# Patient Record
Sex: MALE | Race: WHITE | URBAN - METROPOLITAN AREA
[De-identification: names, ages, dates, MRNs, and addresses within clinical notes are randomized per-mention and may not be internally consistent; named-entity substitution may affect disease eponyms.]

---

## 2017-04-12 ENCOUNTER — OFFICE VISIT (OUTPATIENT)
Dept: OPHTHALMOLOGY | Facility: CLINIC | Age: 77
End: 2017-04-12

## 2017-04-12 DIAGNOSIS — H25.13 SENILE NUCLEAR SCLEROSIS, BILATERAL: ICD-10-CM

## 2017-04-12 DIAGNOSIS — H40.1133 PRIMARY OPEN ANGLE GLAUCOMA OF BOTH EYES, SEVERE STAGE: Primary | ICD-10-CM

## 2017-04-12 RX ORDER — CAPSAICIN 0.025 %
CREAM (GRAM) TOPICAL 3 TIMES DAILY
COMMUNITY

## 2017-04-12 ASSESSMENT — TONOMETRY
OS_IOP_MMHG: 15
OD_IOP_MMHG: 15
IOP_METHOD: APPLANATION

## 2017-04-12 ASSESSMENT — VISUAL ACUITY
OS_SC: 20/25
OD_SC+: -3
OD_SC: 20/25
METHOD: SNELLEN - LINEAR
OS_SC+: -3

## 2017-04-12 ASSESSMENT — EXTERNAL EXAM - RIGHT EYE: OD_EXAM: NORMAL

## 2017-04-12 ASSESSMENT — CONF VISUAL FIELD
OS_INFERIOR_TEMPORAL_RESTRICTION: 3
OS_INFERIOR_NASAL_RESTRICTION: 3
OD_NORMAL: 1

## 2017-04-12 ASSESSMENT — SLIT LAMP EXAM - LIDS
COMMENTS: NORMAL
COMMENTS: NORMAL

## 2017-04-12 ASSESSMENT — EXTERNAL EXAM - LEFT EYE: OS_EXAM: NORMAL

## 2017-04-12 ASSESSMENT — REFRACTION_WEARINGRX: SPECS_TYPE: OTC READERS

## 2017-04-12 NOTE — PROGRESS NOTES
1)POAG OS>OD -- s/p SLT OD (4/25/16), started on gtts in early 2000s, ?s/p SLT OS in 2015 (Dr. Gregory), pt denies LTG, has been on current drop regimen since 2014 per patient, H/O MRI in 2010 that was reportedly WNL -- K pachy:498/498    Tmax:     HVF:OD:inf paracentral dec sens fairly stable with fluct from 4184-7499 and ?prog vs fluct from 2004->2013 and OS:Inf paracentral dec sens with sup paracentral dec sens fairly stable with fluct from 1708-3025 and prog from 2004->2012      CDR:0.85/0.9     HRT/OCT: Mod-Severe RNFL thinning      FHX of Glc: No     Gonio:open       Intolerant to:  Lumigan -- too expensive    Asthma/COPD: No, tolerating topical BB  Steroid Use:No     Kidney Stones: No    Sulfa Allergy: No     IOP targets: -- IOP borderline  -- consider possible combined phaco/trab OS if confirmed evidence of progression on OCT and elevated IOPs  2)NS OU -- was following with Dr. Gregory -- will need IOL master prior combined phaco/trab OS -- pt drives a tractor trailer   3)DM s   4)H/O Rectal CA -- following with Oncology -- in remission since 2003     Patient will continue on Cosopt (Timolol/Dorzolamide) which is a blue top drop 2x/day (12 hours apart) in both eyes, Alphagan (Brimonidine) which is a purple top drop 2x/day (12 hours apart) in both eyes, Travatan/Latanoprost which is a teal top drop at bedtime in both eyes.  Patient will otherwise return to clinic in 4-6 months with visual field test (left eye first), dilated eye exam and IOP check.  Will re-discuss possible combined surgery in the left eye at that time.    Attending Physician Attestation:  Complete documentation of historical and exam elements from today's encounter can be found in the full encounter summary report (not reduplicated in this progress note). I personally obtained the chief complaint(s) and history of present illness.  I confirmed and edited as necessary the review of systems, past medical/surgical history, family history, social  history, and examination findings as documented by others; and I examined the patient myself. I personally reviewed the relevant tests, images, and reports as documented above. I formulated and edited as necessary the assessment and plan and discussed the findings and management plan with the patient and family.  - Annemarie Hubbard MD 10:38 AM 4/12/2017

## 2017-04-12 NOTE — NURSING NOTE
Chief Complaints and History of Present Illnesses   Patient presents with     Follow Up For     POAG OU with visual field OS only, OCT OU     HPI    Affected eye(s):  Both   Symptoms:     No blurred vision   No distorted vision   No redness         Do you have eye pain now?:  No      Comments:  Follow up POAG OU with visual field OS only, OCT OU- patient states doing well; no noticed changes or concerns.  Taking drops as directed: Cosopt twice per day, Alphagan twice per day, and Latanoprost at bedtime.  Last used this morning around 8am.  Recent bout of shingles on left leg and left shoulder-- treating with pills and cream.  No facial involvement per pt.  Lyric AVILA 10:04 AM April 12, 2017

## 2017-04-12 NOTE — PATIENT INSTRUCTIONS
Patient will continue on Cosopt (Timolol/Dorzolamide) which is a blue top drop 2x/day (12 hours apart) in both eyes, Alphagan (Brimonidine) which is a purple top drop 2x/day (12 hours apart) in both eyes, Travatan/Latanoprost which is a teal top drop at bedtime in both eyes.  Patient will otherwise return to clinic in 4-6 months with visual field test (left eye first), dilated eye exam and IOP check.  Will re-discuss possible combined surgery in the left eye at that time.

## 2017-04-12 NOTE — MR AVS SNAPSHOT
After Visit Summary   4/12/2017    Heath Self    MRN: [id number]           Patient Information     Date Of Birth          1940        Visit Information        Provider Department      4/12/2017 9:30 AM Annemarie Hubbard MD [facility]        Today's Diagnoses     Primary open angle glaucoma of both eyes, severe stage    -  1    Senile nuclear sclerosis, bilateral          Care Instructions    Patient will continue on Cosopt (Timolol/Dorzolamide) which is a blue top drop 2x/day (12 hours apart) in both eyes, Alphagan (Brimonidine) which is a purple top drop 2x/day (12 hours apart) in both eyes, Travatan/Latanoprost which is a teal top drop at bedtime in both eyes.  Patient will otherwise return to clinic in 4-6 months with visual field test (left eye first), dilated eye exam and IOP check.  Will re-discuss possible combined surgery in the left eye at that time.        Follow-ups after your visit        Follow-up notes from your care team     Return 4-6 months with visual field test (left eye first), dilated eye exam and IOP check.      Who to contact     Please call your clinic at [phone] to:    Ask questions about your health    Make or cancel appointments    Discuss your medicines    Learn about your test results    Speak to your doctor   If you have compliments or concerns about an experience at your clinic, or if you wish to file a complaint, please contact [facility] Patient Relations at [phone] or email us at [email or web address]         Additional Information About Your Visit        MyChart Information     [employer] is an electronic gateway that provides easy, online access to your medical records. With [employer], you can request a clinic appointment, read your test results, renew a prescription or communicate with your care team.     To sign up for [employer] visit the website at [email or web address]   You  will be asked to enter the access code listed below, as well as some personal information. Please follow the directions to create your username and password.     Your access code is: [email or web address]  Expires: 2017 10:40 AM     Your access code will  in 90 days. If you need help or a new code, please contact your [facility] or call [phone] for assistance.        Care EveryWhere ID     This is your Care EveryWhere ID. This could be used by other organizations to access your [place] medical records  [id number]         Blood Pressure from Last 3 Encounters:   No data found for BP    Weight from Last 3 Encounters:   No data found for Wt              We Performed the Following     OCT Optic Nerve RNFL Spectralis OU (both eyes)     OVF 24-2 Dynamic OS        Primary Care Provider    None Specified       No primary provider on file.        Thank you!     Thank you for choosing [facility]  for your care. Our goal is always to provide you with excellent care. Hearing back from our patients is one way we can continue to improve our services. Please take a few minutes to complete the written survey that you may receive in the mail after your visit with us. Thank you!             Your Updated Medication List - Protect others around you: Learn how to safely use, store and throw away your medicines at www.disposemymeds.org.          This list is accurate as of: 17 10:40 AM.  Always use your most recent med list.                   Brand Name Dispense Instructions for use    AMLODIPINE BESYLATE PO          aspirin 81 MG tablet      Take by mouth daily       brimonidine 0.2 % ophthalmic solution    ALPHAGAN    1 Bottle    Place 1 drop into both eyes 2 times daily       capsaicin 0.025 % Crea cream    ZOSTRIX     Apply topically 3 times daily       DORZOLAMIDE HCL-TIMOLOL MAL OP      Left eye 2 times a day       GLIPIZIDE PO          hydrochlorothiazide  12.5 MG capsule    MICROZIDE     Take 12.5 mg by mouth daily       latanoprost 0.005 % ophthalmic solution    XALATAN    1 Bottle    Place 1 drop into both eyes At Bedtime       LIPITOR PO          LOSARTAN POTASSIUM PO          TRAVATAN Z 0.004 % ophthalmic solution   Generic drug:  travoprost (CRISTO Free)     1 Bottle    Place 1 drop into both eyes At Bedtime       VITAMIN D3 PO      Take by mouth daily

## 2017-08-08 DIAGNOSIS — H40.1124 PRIMARY OPEN-ANGLE GLAUCOMA, LEFT EYE, INDETERMINATE STAGE: Primary | ICD-10-CM

## 2017-08-08 RX ORDER — DORZOLAMIDE HYDROCHLORIDE AND TIMOLOL MALEATE 20; 5 MG/ML; MG/ML
1 SOLUTION/ DROPS OPHTHALMIC 2 TIMES DAILY
Qty: 1 BOTTLE | Refills: 11 | Status: SHIPPED | OUTPATIENT
Start: 2017-08-08 | End: 2018-03-13

## 2017-10-13 ENCOUNTER — OFFICE VISIT (OUTPATIENT)
Dept: OPHTHALMOLOGY | Facility: CLINIC | Age: 77
End: 2017-10-13

## 2017-10-13 DIAGNOSIS — H25.13 SENILE NUCLEAR SCLEROSIS, BILATERAL: ICD-10-CM

## 2017-10-13 DIAGNOSIS — H40.1133 PRIMARY OPEN ANGLE GLAUCOMA OF BOTH EYES, SEVERE STAGE: Primary | ICD-10-CM

## 2017-10-13 ASSESSMENT — SLIT LAMP EXAM - LIDS
COMMENTS: NORMAL
COMMENTS: NORMAL

## 2017-10-13 ASSESSMENT — REFRACTION_WEARINGRX
OD_AXIS: 155
OS_AXIS: 000
OD_CYLINDER: +0.75
OS_SPHERE: +0.75
OD_ADD: +2.50
OD_SPHERE: +1.00
OS_CYLINDER: +0.00

## 2017-10-13 ASSESSMENT — VISUAL ACUITY
OD_SC+: -3
OD_SC: 20/25
OS_SC: 20/25
CORRECTION_TYPE: GLASSES
OS_SC+: -3
METHOD: SNELLEN - LINEAR

## 2017-10-13 ASSESSMENT — CUP TO DISC RATIO
OD_RATIO: 0.85
OS_RATIO: 0.9

## 2017-10-13 ASSESSMENT — EXTERNAL EXAM - RIGHT EYE: OD_EXAM: NORMAL

## 2017-10-13 ASSESSMENT — TONOMETRY
OD_IOP_MMHG: 18
OS_IOP_MMHG: 19
IOP_METHOD: APPLANATION

## 2017-10-13 ASSESSMENT — EXTERNAL EXAM - LEFT EYE: OS_EXAM: NORMAL

## 2017-10-13 NOTE — MR AVS SNAPSHOT
After Visit Summary   10/13/2017    Heath Self    MRN: [id number]           Patient Information     Date Of Birth          1940        Visit Information        Provider Department      10/13/2017 10:45 AM Annemarie Hubbard MD [facility]        Today's Diagnoses     Primary open angle glaucoma of both eyes, severe stage    -  1    Senile nuclear sclerosis, bilateral          Care Instructions    Patient will continue on Cosopt (Timolol/Dorzolamide) which is a blue top drop 2x/day (12 hours apart) in both eyes, Alphagan (Brimonidine) which is a purple top drop 2x/day (12 hours apart) in both eyes, Travatan/Latanoprost which is a teal top drop at bedtime in both eyes.  Patient will otherwise return to clinic in 4-6 months with visual field test, OCT with RNFL analysis and IOP check.  Will re-discuss possible combined surgery in the left eye at that time.    Patient will start warm compresses 2x/day, increase dietary flax seed/fish oil dietary supplementation, and start artificial tears 4-6x/day as needed for burning, tearing, mattering, foreign body sensation, blurred vision, etc.  Artifical tear drops are available over the counter. Below are a list of the some of the drops available:  Refresh   Systane  Theratears  Genteal  Blink  Optive      Please avoid Visine (unless Visine Puretears), Murine or Cleareyes or Puralube tear drops.    These have ingredients that take the red out and but actually increase dryness and redness of the eyes over time            Follow-ups after your visit        Follow-up notes from your care team     Return 4-6 months with visual field test, OCT with RNFL analysis and IOP check.      Your next 10 appointments already scheduled     Feb 14, 2018  9:30 AM CST   RETURN GLAUCOMA with Annemarie Hubbard MD   [facility])    [facility]  [address] [facility]  [address]  [facility] [address] [phone]              Who to contact     Please call your clinic at [phone] to:    Ask questions about your health    Make or cancel appointments    Discuss your medicines    Learn about your test results    Speak to your doctor   If you have compliments or concerns about an experience at your clinic, or if you wish to file a complaint, please contact [facility] Patient Relations at [phone] or email us at [email or web address]         Additional Information About Your Visit        [employer] Information     [employer] is an electronic gateway that provides easy, online access to your medical records. With [employer], you can request a clinic appointment, read your test results, renew a prescription or communicate with your care team.     To sign up for [employer] visit the website at [email or web address]   You will be asked to enter the access code listed below, as well as some personal information. Please follow the directions to create your username and password.     Your access code is: [email or web address]  Expires: 2017  6:30 AM     Your access code will  in 90 days. If you need help or a new code, please contact your [facility] or call [phone] for assistance.        Care EveryWhere ID     This is your Care EveryWhere ID. This could be used by other organizations to access your [place] medical records  [id number]         Blood Pressure from Last 3 Encounters:   No data found for BP    Weight from Last 3 Encounters:   No data found for Wt              We Performed the Following     OVF 24-2 Dynamic OU        Primary Care Provider    Physician No Ref-Primary       NO REF-PRIMARY PHYSICIAN        Equal Access to Services     NAHOMI SMITH : Hadii ignacio Smyth, allyssa guillen, qabrandi gomez . So [facility] [phone].    ATENCIÓN: Si  dahlia fuentes, tiene a lakhani disposición servicios gratuitos de asistencia lingüística. Maggy mckinney [phone].    We comply with applicable federal civil rights laws and [place] laws. We do not discriminate on the basis of race, color, national origin, age, disability, sex, sexual orientation, or gender identity.            Thank you!     Thank you for choosing [facility]  for your care. Our goal is always to provide you with excellent care. Hearing back from our patients is one way we can continue to improve our services. Please take a few minutes to complete the written survey that you may receive in the mail after your visit with us. Thank you!             Your Updated Medication List - Protect others around you: Learn how to safely use, store and throw away your medicines at www.disposemymeds.org.          This list is accurate as of: 10/13/17 11:06 AM.  Always use your most recent med list.                   Brand Name Dispense Instructions for use Diagnosis    AMLODIPINE BESYLATE PO           aspirin 81 MG tablet      Take by mouth daily        brimonidine 0.2 % ophthalmic solution    ALPHAGAN    1 Bottle    Place 1 drop into both eyes 2 times daily    Primary open angle glaucoma of both eyes, severe stage       capsaicin 0.025 % Crea cream    ZOSTRIX     Apply topically 3 times daily        dorzolamide-timolol 2-0.5 % ophthalmic solution    COSOPT    1 Bottle    Place 1 drop Into the left eye 2 times daily Left eye 2 times a day    Primary open-angle glaucoma, left eye, indeterminate stage       GLIPIZIDE PO           hydrochlorothiazide 12.5 MG capsule    MICROZIDE     Take 12.5 mg by mouth daily        latanoprost 0.005 % ophthalmic solution    XALATAN    1 Bottle    Place 1 drop into both eyes At Bedtime    Primary open angle glaucoma of both eyes, severe stage       LIPITOR PO           LOSARTAN POTASSIUM PO           TRAVATAN Z 0.004 % ophthalmic solution   Generic drug:   travoprost (CRISTO Free)     1 Bottle    Place 1 drop into both eyes At Bedtime    Primary open angle glaucoma of both eyes, severe stage       VITAMIN D3 PO      Take by mouth daily

## 2017-10-13 NOTE — PROGRESS NOTES
1)POAG OS>OD -- s/p SLT OD (4/25/16), started on gtts in early 2000s, ?s/p SLT OS in 2015 (Dr. Gregory), pt denies LTG, has been on current drop regimen since 2014 per patient, H/O MRI in 2010 that was reportedly WNL -- K pachy:498/498    Tmax:     HVF:OD:inf paracentral dec sens fairly stable with fluct from 9954-4763 and ?prog vs fluct from 2004->2013 and OS:Inf paracentral dec sens with sup paracentral dec sens fairly stable with fluct from 0649-3105 and prog from 2004->2012      CDR:0.85/0.9     HRT/OCT: Mod-Severe RNFL thinning      FHX of Glc: No     Gonio:open       Intolerant to:  Lumigan -- too expensive    Asthma/COPD: No, tolerating topical BB  Steroid Use:No     Kidney Stones: No    Sulfa Allergy: No     IOP targets: -- IOP borderline  -- consider possible combined phaco/trab OS if confirmed evidence of progression on OCT and elevated IOPs -- watch OD for prog on HVF  2)NS OU -- was following with Dr. 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  Patient will otherwise return to clinic in 4-6 months with visual field test, OCT with RNFL analysis and IOP check.  Will re-discuss possible combined surgery in the left eye at that time.    Attending Physician Attestation:  Complete documentation of historical and exam elements from today's encounter can be found in the full encounter summary report (not reduplicated in this progress note). I personally obtained the chief complaint(s) and history of present illness.  I confirmed and edited as necessary the review of systems, past medical/surgical history,  family history, social history, and examination findings as documented by others; and I examined the patient myself. I personally reviewed the relevant tests, images, and reports as documented above. I formulated and edited as necessary the assessment and plan and discussed the findings and management plan with the patient and family.  - Annemarie Hubbard MD

## 2017-10-13 NOTE — NURSING NOTE
Chief Complaints and History of Present Illnesses   Patient presents with     Glaucoma Follow Up     HPI    Last Eye Exam:  4/12/17   Affected eye(s):  Both   Symptoms:     Decreased vision (Comment: Notice foggy vision in LE )      Duration:  6 months   Frequency:  Constant       Do you have eye pain now?:  No      Comments:  Pt returns for 6 month follow up on POAG. LE sidhu in morning, also notes that eyes burn after using other drops, goes away quickly. BS=  124  Last checked    A1C=   ? Pt unaware, just checked    PCP=   Dr. Rose Marie SIMMONS [facility]. Vision seems a bit blurry. ROHIT ANDRADE, COA 10:24 AM 10/13/2017

## 2017-10-13 NOTE — PATIENT INSTRUCTIONS
Patient will continue on Cosopt (Timolol/Dorzolamide) which is a blue top drop 2x/day (12 hours apart) in both eyes, Alphagan (Brimonidine) which is a purple top drop 2x/day (12 hours apart) in both eyes, Travatan/Latanoprost which is a teal top drop at bedtime in both eyes.  Patient will otherwise return to clinic in 4-6 months with visual field test, OCT with RNFL analysis and IOP check.  Will re-discuss possible combined surgery in the left eye at that time.    Patient will start warm compresses 2x/day, increase dietary flax seed/fish oil dietary supplementation, and start artificial tears 4-6x/day as needed for burning, tearing, mattering, foreign body sensation, blurred vision, etc.  Artifical tear drops are available over the counter. Below are a list of the some of the drops available:  Refresh   Systane  Theratears  Genteal  Blink  Optive      Please avoid Visine (unless Visine Puretears), Murine or Cleareyes or Puralube tear drops.    These have ingredients that take the red out and but actually increase dryness and redness of the eyes over time

## 2018-02-14 DIAGNOSIS — H40.1133 PRIMARY OPEN ANGLE GLAUCOMA OF BOTH EYES, SEVERE STAGE: ICD-10-CM

## 2018-02-28 RX ORDER — LATANOPROST 50 UG/ML
1 SOLUTION/ DROPS OPHTHALMIC AT BEDTIME
Qty: 7.5 ML | Refills: 3 | Status: SHIPPED | OUTPATIENT
Start: 2018-02-28

## 2018-03-13 ENCOUNTER — OFFICE VISIT (OUTPATIENT)
Dept: OPHTHALMOLOGY | Facility: CLINIC | Age: 78
End: 2018-03-13
Payer: COMMERCIAL

## 2018-03-13 DIAGNOSIS — H25.13 SENILE NUCLEAR SCLEROSIS, BILATERAL: ICD-10-CM

## 2018-03-13 DIAGNOSIS — H40.1124 PRIMARY OPEN-ANGLE GLAUCOMA, LEFT EYE, INDETERMINATE STAGE: Primary | ICD-10-CM

## 2018-03-13 DIAGNOSIS — H04.123 DRY EYES, BILATERAL: ICD-10-CM

## 2018-03-13 DIAGNOSIS — H01.00B BLEPHARITIS OF UPPER AND LOWER EYELIDS OF BOTH EYES, UNSPECIFIED TYPE: ICD-10-CM

## 2018-03-13 DIAGNOSIS — H01.00A BLEPHARITIS OF UPPER AND LOWER EYELIDS OF BOTH EYES, UNSPECIFIED TYPE: ICD-10-CM

## 2018-03-13 RX ORDER — DORZOLAMIDE HYDROCHLORIDE AND TIMOLOL MALEATE 20; 5 MG/ML; MG/ML
1 SOLUTION/ DROPS OPHTHALMIC 2 TIMES DAILY
Qty: 10 ML | Refills: 11 | Status: SHIPPED | OUTPATIENT
Start: 2018-03-13

## 2018-03-13 ASSESSMENT — TONOMETRY
OD_IOP_MMHG: 15
IOP_METHOD: APPLANATION
OS_IOP_MMHG: 14

## 2018-03-13 ASSESSMENT — EXTERNAL EXAM - RIGHT EYE: OD_EXAM: NORMAL

## 2018-03-13 ASSESSMENT — EXTERNAL EXAM - LEFT EYE: OS_EXAM: NORMAL

## 2018-03-13 ASSESSMENT — CUP TO DISC RATIO
OS_RATIO: 0.9
OD_RATIO: 0.85

## 2018-03-13 ASSESSMENT — VISUAL ACUITY
METHOD: SNELLEN - LINEAR
CORRECTION_TYPE: GLASSES
OS_CC: 20/25
OD_CC: 20/25

## 2018-03-13 ASSESSMENT — CONF VISUAL FIELD
OD_NORMAL: 1
OS_NORMAL: 1

## 2018-03-13 NOTE — PROGRESS NOTES
HPI  Heath Self is a 77 year old male here for red eyes and crusting both eyes for the last week. No itching but some irritation at the lateral corners.   He had to cancel last glaucoma appointment, has not reschedule with Dr. Hubbard yet.  He just ran out of cosopt.  He is using other drops without problems.  He feels he is seeing well at home.       Assessment & Plan      (H40.1124) Primary open-angle glaucoma, left eye, indeterminate stage - Both Eyes  (primary encounter diagnosis)  Comment: stable iop today ou     POAG OS>OD -- s/p SLT OD (4/25/16), started on gtts in early 2000s, ?s/p SLT OS in 2015 (Dr. Gregory), pt denies LTG, has been on current drop regimen since 2014 per patient, H/O MRI in 2010 that was reportedly WNL -- K pachy:498/498    Tmax:     HVF:OD:inf paracentral dec sens fairly stable with fluct from 8410-4230 and ?prog vs fluct from 2004->2013 and OS:Inf paracentral dec sens with sup paracentral dec sens fairly stable with fluct from 5591-8443 and prog from 2004->2012      CDR:0.85/0.9     HRT/OCT: Mod-Severe RNFL thinning      FHX of Glc: No     Gonio:open       Intolerant to:  Lumigan -- too expensive    Asthma/COPD: No, tolerating topical BB  Steroid Use:No     Kidney Stones: No    Sulfa Allergy: No     IOP targets: -- IOP borderline  -- consider possible combined phaco/trab OS if confirmed evidence of progression on OCT and elevated IOPs -- watch OD for prog on HVF      Plan: dorzolamide-timolol (COSOPT) 2-0.5 % ophthalmic solution- reordered, discussed options if still on backorder at his pharmacy and gave him number to call with problems.    Patient will continue on Cosopt (Timolol/Dorzolamide) which is a blue top drop 2x/day (12 hours apart) in both eyes, Alphagan (Brimonidine) which is a purple top drop 2x/day (12 hours apart) in both eyes, Travatan/Latanoprost which is a teal top drop at bedtime in both eyes.       (H04.123) Dry eyes, bilateral - Both Eyes  Comment: may have  component of brimonidine allergy (bulbar follicles), not itching currently  Plan: rec preservative free artificial tears 6 times per day as needed.  Ok to use the bottle of preserved artificial tears 4 times per day or less.  Stop Visine, discussed rebound redness.    (H25.13) Senile nuclear sclerosis, bilateral - Both Eyes  Comment: was following with Dr. 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      Artificial tears 4-6 times per day as needed for discomfort              Call with worsening or new redness/discharge       DM s   H/O Rectal CA -- following with Oncology -- in remission since 2003     -----------------------------------------------------------------------------------    Patient disposition:   Return in about 2 weeks (around 3/27/2018) for reschedule w/ Dr. Hubbard for Return 4-6 months with visual field test, OCT RNFL analysis and IOP . Call for sooner appointment as needed.  JLB will re-discuss possible combined surgery in the left eye at that time.      Complete documentation of historical and exam elements from today's encounter can be found in the full encounter summary report (not reduplicated in this progress note). I personally obtained the chief complaint(s) and history of present illness.  I have confirmed and edited as necessary the CC, HPI, PMH/PSH, social history, FMH, ROS, and exam/neuro findings as obtained by the technician or others. I have examined this patient myself and I personally viewed the image(s) and studies listed above and the documentation reflects my findings and interpretation.

## 2018-03-13 NOTE — MR AVS SNAPSHOT
After Visit Summary   3/13/2018    Heath Self    MRN: [id number]           Patient Information     Date Of Birth          1940        Visit Information        Provider Department      3/13/2018 9:40 AM Dixie Bdeolla MD [facility]        Today's Diagnoses     Primary open-angle glaucoma, left eye, indeterminate stage - Both Eyes    -  1    Dry eyes, bilateral - Both Eyes        Senile nuclear sclerosis, bilateral - Both Eyes        Blepharitis of upper and lower eyelids of both eyes, unspecified type - Both Eyes           Follow-ups after your visit        Follow-up notes from your care team     Return in about 2 weeks (around 3/27/2018) for reschedule w/ Dr. Hubbard for Return 4-6 months with visual field test, OCT RNFL analysis and IOP .      Your next 10 appointments already scheduled     Mar 30, 2018  7:45 AM CDT   RETURN GLAUCOMA with Annemarie Hubbard MD   Eye Clinic ([facility])    [address] [address] [phone]              Who to contact     Please call your clinic at [phone] to:    Ask questions about your health    Make or cancel appointments    Discuss your medicines    Learn about your test results    Speak to your doctor            Additional Information About Your Visit        MyChart Information     [employer] is an electronic gateway that provides easy, online access to your medical records. With yWorld, you can request a clinic appointment, read your test results, renew a prescription or communicate with your care team.     To sign up for [employer] visit the website at [email or web address]   You will be asked to enter the access code listed below, as well as some personal information. Please follow the directions to create your username and password.     Your access code is: [email or web address]  Expires: 5/24/2018  7:30 AM     Your access code will   in 90 days. If you need help or a new code, please contact your [facility] or call [phone] for assistance.        Care EveryWhere ID     This is your Care EveryWhere ID. This could be used by other organizations to access your [place] medical records  [id number]         Blood Pressure from Last 3 Encounters:   No data found for BP    Weight from Last 3 Encounters:   No data found for Wt              Today, you had the following     No orders found for display         Where to get your medicines      These medications were sent to [facility]/pharmacy #[id number] - [place], [place] - [address] OF [facility]  [address] , [facility] [address]     Phone:  [phone]     dorzolamide-timolol 2-0.5 % ophthalmic solution          Primary Care Provider Fax #    Physician No Ref-Primary [phone]       No address on file        Equal Access to Services     [facility] : Hadii aad ku hadasho Soneha, waaxda luqadaha, qaybta kaalmada adeegyada, brandi rico haybridget maurice . So [facility] [phone].    ATENCIÓN: Si habla español, tiene a lakhani disposición servicios gratuitos de asistencia lingüística. Llame al [phone].    We comply with applicable federal civil rights laws and [place] laws. We do not discriminate on the basis of race, color, national origin, age, disability, sex, sexual orientation, or gender identity.            Thank you!     Thank you for choosing [facility]  for your care. Our goal is always to provide you with excellent care. Hearing back from our patients is one way we can continue to improve our services. Please take a few minutes to complete the written survey that you may receive in the mail after your visit with us. Thank you!             Your Updated Medication List - Protect others around you: Learn how to safely use, store and throw away your medicines at  www.disposemymeds.org.          This list is accurate as of 3/13/18 12:05 PM.  Always use your most recent med list.                   Brand Name Dispense Instructions for use Diagnosis    AMLODIPINE BESYLATE PO           aspirin 81 MG tablet      Take by mouth daily        brimonidine 0.2 % ophthalmic solution    ALPHAGAN    1 Bottle    Place 1 drop into both eyes 2 times daily    Primary open angle glaucoma of both eyes, severe stage       capsaicin 0.025 % Crea cream    ZOSTRIX     Apply topically 3 times daily        dorzolamide-timolol 2-0.5 % ophthalmic solution    COSOPT    10 mL    Place 1 drop Into the left eye 2 times daily Left eye 2 times a day    Primary open-angle glaucoma, left eye, indeterminate stage       GLIPIZIDE PO           hydrochlorothiazide 12.5 MG capsule    MICROZIDE     Take 12.5 mg by mouth daily        latanoprost 0.005 % ophthalmic solution    XALATAN    7.5 mL    Place 1 drop into both eyes At Bedtime    Primary open angle glaucoma of both eyes, severe stage       LIPITOR PO           LOSARTAN POTASSIUM PO           TRAVATAN Z 0.004 % ophthalmic solution   Generic drug:  travoprost (CRISTO Free)     1 Bottle    Place 1 drop into both eyes At Bedtime    Primary open angle glaucoma of both eyes, severe stage       VITAMIN D3 PO      Take by mouth daily

## 2018-03-13 NOTE — NURSING NOTE
Chief Complaints and History of Present Illnesses   Patient presents with     Dry Eye(s) Both Eyes     Discharge     HPI    Affected eye(s):  Both   Symptoms:        Duration:  1 month   Frequency:  Intermittent, Daily       Do you have eye pain now?:  No      Comments:  Using visine(told him to stop)  They would get dryer and dryer  Latanoprost BE at night(he works 2nd shift)  Generic Cospost BID LE( ran out yesterday)  Courtney Le COT 9:42 AM March 13, 2018

## 2018-03-30 ENCOUNTER — OFFICE VISIT (OUTPATIENT)
Dept: OPHTHALMOLOGY | Facility: CLINIC | Age: 78
End: 2018-03-30
Attending: OPHTHALMOLOGY
Payer: MEDICARE

## 2018-03-30 DIAGNOSIS — H04.123 DRY EYES, BILATERAL: ICD-10-CM

## 2018-03-30 DIAGNOSIS — H40.1132 PRIMARY OPEN ANGLE GLAUCOMA OF BOTH EYES, MODERATE STAGE: Primary | ICD-10-CM

## 2018-03-30 DIAGNOSIS — H25.13 SENILE NUCLEAR SCLEROSIS, BILATERAL: ICD-10-CM

## 2018-03-30 PROCEDURE — 92083 EXTENDED VISUAL FIELD XM: CPT | Mod: ZF | Performed by: OPHTHALMOLOGY

## 2018-03-30 PROCEDURE — 92133 CPTRZD OPH DX IMG PST SGM ON: CPT | Mod: ZF | Performed by: OPHTHALMOLOGY

## 2018-03-30 PROCEDURE — G0463 HOSPITAL OUTPT CLINIC VISIT: HCPCS | Mod: ZF

## 2018-03-30 RX ORDER — LATANOPROST 50 UG/ML
1 SOLUTION/ DROPS OPHTHALMIC AT BEDTIME
Qty: 1 BOTTLE | Refills: 11 | Status: SHIPPED | OUTPATIENT
Start: 2018-03-30

## 2018-03-30 ASSESSMENT — VISUAL ACUITY
OD_SC: 20/20
OD_SC+: -2
OS_SC: 20/40
OS_PH_SC: 20/20-2
METHOD: SNELLEN - LINEAR

## 2018-03-30 ASSESSMENT — EXTERNAL EXAM - RIGHT EYE: OD_EXAM: NORMAL

## 2018-03-30 ASSESSMENT — EXTERNAL EXAM - LEFT EYE: OS_EXAM: NORMAL

## 2018-03-30 ASSESSMENT — CONF VISUAL FIELD
OS_NORMAL: 1
OD_NORMAL: 1

## 2018-03-30 ASSESSMENT — TONOMETRY
OS_IOP_MMHG: 18
OD_IOP_MMHG: 17
IOP_METHOD: APPLANATION

## 2018-03-30 NOTE — MR AVS SNAPSHOT
After Visit Summary   3/30/2018    Heath Self    MRN: [id number]           Patient Information     Date Of Birth          1940        Visit Information        Provider Department      3/30/2018 7:45 AM Annemarie Hubbard MD [facility]        Today's Diagnoses     Primary open angle glaucoma of both eyes, moderate stage    -  1    Dry eyes, bilateral - Both Eyes        Senile nuclear sclerosis, bilateral - Both Eyes          Care Instructions    Patient will continue on Cosopt (Timolol/Dorzolamide) which is a blue top drop 2x/day (12 hours apart) in both eyes, Alphagan (Brimonidine) which is a purple top drop 2x/day (12 hours apart) in both eyes, Travatan/Latanoprost which is a teal top drop at bedtime in both eyes.  Patient will otherwise return to clinic in 3-4 months with visual field test (OS:LVC only) and IOP check.  Will re-discuss possible combined surgery in the left eye at that time.          Follow-ups after your visit        Follow-up notes from your care team     Return 4 months with visual field test (OS:LVC only) and IOP check., for Dynamic VF 24-2.      Who to contact     Please call your clinic at [phone] to:    Ask questions about your health    Make or cancel appointments    Discuss your medicines    Learn about your test results    Speak to your doctor            Additional Information About Your Visit        MyChart Information     [employer] is an electronic gateway that provides easy, online access to your medical records. With [employer], you can request a clinic appointment, read your test results, renew a prescription or communicate with your care team.     To sign up for [employer] visit the website at [email or web address]   You will be asked to enter the access code listed below, as well as some personal information. Please follow the directions to create your username and password.     Your access code is: [email or web address]  Expires: 5/24/2018  7:30 AM     Your  access code will  in 90 days. If you need help or a new code, please contact your [facility] or call [phone] for assistance.        Care EveryWhere ID     This is your Care EveryWhere ID. This could be used by other organizations to access your [place] medical records  [id number]         Blood Pressure from Last 3 Encounters:   No data found for BP    Weight from Last 3 Encounters:   No data found for Wt              We Performed the Following     OCT Optic Nerve RNFL Spectralis OU (both eyes)     OVF 24-2 Dynamic OU          Today's Medication Changes          These changes are accurate as of 3/30/18  9:53 AM.  If you have any questions, ask your nurse or doctor.               These medicines have changed or have updated prescriptions.        Dose/Directions    * latanoprost 0.005 % ophthalmic solution   Commonly known as:  XALATAN   This may have changed:  Another medication with the same name was added. Make sure you understand how and when to take each.   Used for:  Primary open angle glaucoma of both eyes, severe stage   Changed by:  Annemarie Hubbard MD        Dose:  1 drop   Place 1 drop into both eyes At Bedtime   Quantity:  7.5 mL   Refills:  3       * latanoprost 0.005 % ophthalmic solution   Commonly known as:  XALATAN   This may have changed:  You were already taking a medication with the same name, and this prescription was added. Make sure you understand how and when to take each.   Used for:  Primary open angle glaucoma of both eyes, moderate stage   Changed by:  Annemarie Hubbard MD        Dose:  1 drop   Place 1 drop into both eyes At Bedtime   Quantity:  1 Bottle   Refills:  11       * Notice:  This list has 2 medication(s) that are the same as other medications prescribed for you. Read the directions carefully, and ask your doctor or other care provider to review them with you.         Where to get your medicines      Some of these will need a  paper prescription and others can be bought over the counter.  Ask your nurse if you have questions.     Bring a paper prescription for each of these medications     latanoprost 0.005 % ophthalmic solution                Primary Care Provider Fax #    Physician No Ref-Primary [phone]       No address on file        Equal Access to Services     NAHOMI SARAH : Hadii ignacio george yessio Sovalentineali, waaxda luqadaha, qaybta kaalmada adedayyada, brandi giorgioin hayaashu barrettday abreu la'eulashu britt. So [facility] [phone].    ATENCIÓN: Si habla español, tiene a lakhani disposición servicios gratuitos de asistencia lingüística. Llame al [phone].    We comply with applicable federal civil rights laws and [place] laws. We do not discriminate on the basis of race, color, national origin, age, disability, sex, sexual orientation, or gender identity.            Thank you!     Thank you for choosing [facility]  for your care. Our goal is always to provide you with excellent care. Hearing back from our patients is one way we can continue to improve our services. Please take a few minutes to complete the written survey that you may receive in the mail after your visit with us. Thank you!             Your Updated Medication List - Protect others around you: Learn how to safely use, store and throw away your medicines at www.disposemymeds.org.          This list is accurate as of 3/30/18  9:53 AM.  Always use your most recent med list.                   Brand Name Dispense Instructions for use Diagnosis    AMLODIPINE BESYLATE PO           aspirin 81 MG tablet      Take by mouth daily        brimonidine 0.2 % ophthalmic solution    ALPHAGAN    1 Bottle    Place 1 drop into both eyes 2 times daily    Primary open angle glaucoma of both eyes, severe stage       capsaicin 0.025 % Crea cream    ZOSTRIX     Apply topically 3 times daily        dorzolamide-timolol 2-0.5 % ophthalmic solution    COSOPT    10 mL    Place 1 drop Into the left eye 2 times  daily Left eye 2 times a day    Primary open-angle glaucoma, left eye, indeterminate stage       GLIPIZIDE PO           hydrochlorothiazide 12.5 MG capsule    MICROZIDE     Take 12.5 mg by mouth daily        * latanoprost 0.005 % ophthalmic solution    XALATAN    7.5 mL    Place 1 drop into both eyes At Bedtime    Primary open angle glaucoma of both eyes, severe stage       * latanoprost 0.005 % ophthalmic solution    XALATAN    1 Bottle    Place 1 drop into both eyes At Bedtime    Primary open angle glaucoma of both eyes, moderate stage       LIPITOR PO           LOSARTAN POTASSIUM PO           TRAVATAN Z 0.004 % ophthalmic solution   Generic drug:  travoprost (CRISTO Free)     1 Bottle    Place 1 drop into both eyes At Bedtime    Primary open angle glaucoma of both eyes, severe stage       VITAMIN D3 PO      Take by mouth daily        * Notice:  This list has 2 medication(s) that are the same as other medications prescribed for you. Read the directions carefully, and ask your doctor or other care provider to review them with you.

## 2018-03-30 NOTE — PATIENT INSTRUCTIONS
Patient will continue on Cosopt (Timolol/Dorzolamide) which is a blue top drop 2x/day (12 hours apart) in both eyes, Alphagan (Brimonidine) which is a purple top drop 2x/day (12 hours apart) in both eyes, Travatan/Latanoprost which is a teal top drop at bedtime in both eyes.  Patient will otherwise return to clinic in 3-4 months with visual field test (OS:LVC only) and IOP check.  Will re-discuss possible combined surgery in the left eye at that time.

## 2018-03-30 NOTE — PROGRESS NOTES
1)POAG OS>OD -- s/p SLT OD (4/25/16), started on gtts in early 2000s, ?s/p SLT OS in 2015 (Dr. Gregory), pt denies LTG, has been on current drop regimen since 2014 per patient, H/O MRI in 2010 that was reportedly WNL -- K pachy:498/498    Tmax:     HVF:OD:inf paracentral dec sens fairly stable with fluct from 0298-0391 and ?prog vs fluct from 2004->2013 and OS:Inf paracentral dec sens with sup paracentral dec sens fairly stable with fluct from 2636-1355 and prog from 2004->2012      CDR:0.85/0.9     HRT/OCT: Mod-Severe RNFL thinning      FHX of Glc: No     Gonio:open       Intolerant to:  Lumigan -- too expensive    Asthma/COPD: No, tolerating topical BB  Steroid Use:No     Kidney Stones: No    Sulfa Allergy: No     IOP targets: -- IOP borderline  -- consider possible combined phaco/trab OS if confirmed evidence of progression on OCT and elevated IOPs   2)NS OU -- was following with Dr. 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  Patient will otherwise return to clinic in 3-4 months with visual field test (OS:LVC only) and IOP check.  Will re-discuss possible combined surgery in the left eye at that time.      Resident note:  1. Primary open angle glaucoma (POAG)  visual field 24-2 03/30/18   OD: Reliable, improvement in MD. Inferior and superior paracentral defects stable/better.  OS: Unreliable - high false positives. inferior hemifield defect more dense than previous  OCT 03/30/18   OD: severe thinning   OS: severe thinning  2. nuclear sclerosis  3. Diabetes  mellitus with diabetic retinopathy   4. Dry eyes    IOP well controlled    PLAN:  Stop Visine  Start artificial tears  Continue Cosopt both eyes  Continue Alphagan both eyes  Continue Latanoprost both eyes    Return 6 months with visual field 24-2 both eyes    Calos Devine MD  PGY3     Attending Physician Attestation:  Complete documentation of historical and exam elements from today's encounter can be found in the full encounter summary report (not reduplicated in this progress note). I personally obtained the chief complaint(s) and history of present illness.  I confirmed and edited as necessary the review of systems, past medical/surgical history, family history, social history, and examination findings as documented by others; and I examined the patient myself. I personally reviewed the relevant tests, images, and reports as documented above. I formulated and edited as necessary the assessment and plan and discussed the findings and management plan with the patient and family.  - Annemarie Hubbard MD

## 2018-08-10 ENCOUNTER — OFFICE VISIT (OUTPATIENT)
Dept: OPHTHALMOLOGY | Facility: CLINIC | Age: 78
End: 2018-08-10
Payer: COMMERCIAL

## 2018-08-10 DIAGNOSIS — H25.13 SENILE NUCLEAR SCLEROSIS, BILATERAL: ICD-10-CM

## 2018-08-10 DIAGNOSIS — H40.1133 PRIMARY OPEN ANGLE GLAUCOMA OF BOTH EYES, SEVERE STAGE: Primary | ICD-10-CM

## 2018-08-10 RX ORDER — LATANOPROST 50 UG/ML
1 SOLUTION/ DROPS OPHTHALMIC AT BEDTIME
Qty: 1 BOTTLE | Refills: 11 | Status: SHIPPED | OUTPATIENT
Start: 2018-08-10

## 2018-08-10 RX ORDER — BRIMONIDINE TARTRATE 2 MG/ML
1 SOLUTION/ DROPS OPHTHALMIC 3 TIMES DAILY
Qty: 15 ML | Refills: 11 | Status: SHIPPED | OUTPATIENT
Start: 2018-08-10

## 2018-08-10 RX ORDER — DORZOLAMIDE HYDROCHLORIDE AND TIMOLOL MALEATE 20; 5 MG/ML; MG/ML
1 SOLUTION/ DROPS OPHTHALMIC 2 TIMES DAILY
Qty: 1 BOTTLE | Refills: 11 | Status: SHIPPED | OUTPATIENT
Start: 2018-08-10

## 2018-08-10 ASSESSMENT — VISUAL ACUITY
OD_SC: 20/20-3
OS_SC: 20/40-2
METHOD: SNELLEN - LINEAR

## 2018-08-10 ASSESSMENT — TONOMETRY
OD_IOP_MMHG: 16
OS_IOP_MMHG: 17
IOP_METHOD: APPLANATION

## 2018-08-10 ASSESSMENT — CONF VISUAL FIELD
METHOD: COUNTING FINGERS
OS_NORMAL: 1
OD_NORMAL: 1

## 2018-08-10 ASSESSMENT — EXTERNAL EXAM - RIGHT EYE: OD_EXAM: NORMAL

## 2018-08-10 ASSESSMENT — EXTERNAL EXAM - LEFT EYE: OS_EXAM: NORMAL

## 2018-08-10 NOTE — MR AVS SNAPSHOT
After Visit Summary   8/10/2018    Heath Self    MRN: [id number]           Patient Information     Date Of Birth          1940        Visit Information        Provider Department      8/10/2018 9:15 AM Annemarie Hubbard MD [facility]        Today's Diagnoses     Primary open angle glaucoma of both eyes, severe stage    -  1    Senile nuclear sclerosis, bilateral          Care Instructions    Patient will continue on Cosopt (Timolol/Dorzolamide) which is a blue top drop 2x/day (12 hours apart) in both eyes, Alphagan (Brimonidine) which is a purple top drop 2x/day (12 hours apart) in both eyes, Travatan/Latanoprost which is a teal top drop at bedtime in both eyes.  Patient will otherwise return to clinic in 4-6 months with refraction (prior to consideration of cataract surgery), visual field test (OS:LVC only), dilated eye exam, IOL master, OCT with RNFL analysis and IOP check.  Will re-discuss possible combined surgery in the left eye at that time.          Follow-ups after your visit        Follow-up notes from your care team     Return 4-6 months with refraction (prior to consideration of cataract surgery), visual field test (OS:LVC o.      Who to contact     Please call your clinic at [phone] to:    Ask questions about your health    Make or cancel appointments    Discuss your medicines    Learn about your test results    Speak to your doctor            Additional Information About Your Visit        MyChart Information     Keego is an electronic gateway that provides easy, online access to your medical records. With Ke[employer], you can request a clinic appointment, read your test results, renew a prescription or communicate with your care team.     To sign up for [employer] visit the website at [email or web address]   You will be asked to enter the access code listed below, as well as some personal information. Please follow the directions to create  your username and password.     Your access code is: [email or web address]  Expires: 10/25/2018  6:31 AM     Your access code will  in 90 days. If you need help or a new code, please contact your [facility] or call [phone] for assistance.        Care EveryWhere ID     This is your Care EveryWhere ID. This could be used by other organizations to access your [place] medical records  [id number]         Blood Pressure from Last 3 Encounters:   No data found for BP    Weight from Last 3 Encounters:   No data found for Wt              We Performed the Following     OVF 24-2 Dynamic OS          Today's Medication Changes          These changes are accurate as of 8/10/18 11:00 AM.  If you have any questions, ask your nurse or doctor.               These medicines have changed or have updated prescriptions.        Dose/Directions    * brimonidine 0.2 % ophthalmic solution   Commonly known as:  ALPHAGAN   This may have changed:  Another medication with the same name was added. Make sure you understand how and when to take each.   Used for:  Primary open angle glaucoma of both eyes, severe stage   Changed by:  Annemarie Hubbard MD        Dose:  1 drop   Place 1 drop into both eyes 2 times daily   Quantity:  1 Bottle   Refills:  11       * brimonidine 0.2 % ophthalmic solution   Commonly known as:  ALPHAGAN   This may have changed:  You were already taking a medication with the same name, and this prescription was added. Make sure you understand how and when to take each.   Used for:  Primary open angle glaucoma of both eyes, severe stage   Changed by:  Annemarie Hubbard MD        Dose:  1 drop   Place 1 drop into both eyes 3 times daily   Quantity:  15 mL   Refills:  11       * dorzolamide-timolol 2-0.5 % ophthalmic solution   Commonly known as:  COSOPT   This may have changed:  Another medication with the same name was added. Make sure you understand how and when to take each.   Used  for:  Primary open-angle glaucoma, left eye, indeterminate stage   Changed by:  Annemarie Hubbard MD        Dose:  1 drop   Place 1 drop Into the left eye 2 times daily Left eye 2 times a day   Quantity:  10 mL   Refills:  11       * dorzolamide-timolol 2-0.5 % ophthalmic solution   Commonly known as:  COSOPT   This may have changed:  You were already taking a medication with the same name, and this prescription was added. Make sure you understand how and when to take each.   Used for:  Primary open angle glaucoma of both eyes, severe stage   Changed by:  Annemarie Hubbard MD        Dose:  1 drop   Place 1 drop into both eyes 2 times daily   Quantity:  1 Bottle   Refills:  11       * latanoprost 0.005 % ophthalmic solution   Commonly known as:  XALATAN   This may have changed:  Another medication with the same name was added. Make sure you understand how and when to take each.   Used for:  Primary open angle glaucoma of both eyes, severe stage   Changed by:  Annemarie Hubbard MD        Dose:  1 drop   Place 1 drop into both eyes At Bedtime   Quantity:  7.5 mL   Refills:  3       * latanoprost 0.005 % ophthalmic solution   Commonly known as:  XALATAN   This may have changed:  Another medication with the same name was added. Make sure you understand how and when to take each.   Used for:  Primary open angle glaucoma of both eyes, moderate stage   Changed by:  Annemarie Hubbard MD        Dose:  1 drop   Place 1 drop into both eyes At Bedtime   Quantity:  1 Bottle   Refills:  11       * latanoprost 0.005 % ophthalmic solution   Commonly known as:  XALATAN   This may have changed:  You were already taking a medication with the same name, and this prescription was added. Make sure you understand how and when to take each.   Used for:  Primary open angle glaucoma of both eyes, severe stage   Changed by:  Annemarie Hubbard MD        Dose:  1 drop   Place 1 drop into both eyes At Bedtime   Quantity:  1 Bottle    Refills:  11       * Notice:  This list has 7 medication(s) that are the same as other medications prescribed for you. Read the directions carefully, and ask your doctor or other care provider to review them with you.         Where to get your medicines      These medications were sent to [id number] [place] [facility] [address]  AT [address] OF [facility]  [address] , [facility] [address]     Phone:  [phone]     brimonidine 0.2 % ophthalmic solution    dorzolamide-timolol 2-0.5 % ophthalmic solution    latanoprost 0.005 % ophthalmic solution                Primary Care Provider Fax #    Physician No Ref-Primary [phone]       No address on file        Equal Access to Services     NAHOMI SMITH : Giovana Smyth, waaxda luqadaha, qaybta kaalmada adeegyaanuel, brandi maurice . So [facility] [phone].    ATENCIÓN: Si habla español, tiene a lakhani disposición servicios gratuitos de asistencia lingüística. [facility] [phone].    We comply with applicable federal civil rights laws and [place] laws. We do not discriminate on the basis of race, color, national origin, age, disability, sex, sexual orientation, or gender identity.            Thank you!     Thank you for choosing [facility]  for your care. Our goal is always to provide you with excellent care. Hearing back from our patients is one way we can continue to improve our services. Please take a few minutes to complete the written survey that you may receive in the mail after your visit with us. Thank you!             Your Updated Medication List - Protect others around you: Learn how to safely use, store and throw away your medicines at www.disposemymeds.org.          This list is accurate as of 8/10/18 11:00 AM.  Always use your most recent med list.                   Brand Name Dispense Instructions for use Diagnosis    AMLODIPINE BESYLATE PO            aspirin 81 MG tablet      Take by mouth daily        * brimonidine 0.2 % ophthalmic solution    ALPHAGAN    1 Bottle    Place 1 drop into both eyes 2 times daily    Primary open angle glaucoma of both eyes, severe stage       * brimonidine 0.2 % ophthalmic solution    ALPHAGAN    15 mL    Place 1 drop into both eyes 3 times daily    Primary open angle glaucoma of both eyes, severe stage       capsaicin 0.025 % Crea cream    ZOSTRIX     Apply topically 3 times daily        * dorzolamide-timolol 2-0.5 % ophthalmic solution    COSOPT    10 mL    Place 1 drop Into the left eye 2 times daily Left eye 2 times a day    Primary open-angle glaucoma, left eye, indeterminate stage       * dorzolamide-timolol 2-0.5 % ophthalmic solution    COSOPT    1 Bottle    Place 1 drop into both eyes 2 times daily    Primary open angle glaucoma of both eyes, severe stage       GLIPIZIDE PO           hydrochlorothiazide 12.5 MG capsule    MICROZIDE     Take 12.5 mg by mouth daily        * latanoprost 0.005 % ophthalmic solution    XALATAN    7.5 mL    Place 1 drop into both eyes At Bedtime    Primary open angle glaucoma of both eyes, severe stage       * latanoprost 0.005 % ophthalmic solution    XALATAN    1 Bottle    Place 1 drop into both eyes At Bedtime    Primary open angle glaucoma of both eyes, moderate stage       * latanoprost 0.005 % ophthalmic solution    XALATAN    1 Bottle    Place 1 drop into both eyes At Bedtime    Primary open angle glaucoma of both eyes, severe stage       LIPITOR PO           LOSARTAN POTASSIUM PO           TRAVATAN Z 0.004 % ophthalmic solution   Generic drug:  travoprost (CRISTO Free)     1 Bottle    Place 1 drop into both eyes At Bedtime    Primary open angle glaucoma of both eyes, severe stage       VITAMIN D3 PO      Take by mouth daily        * Notice:  This list has 7 medication(s) that are the same as other medications prescribed for you. Read the directions carefully, and ask your doctor or other  care provider to review them with you.

## 2018-08-10 NOTE — PROGRESS NOTES
1)POAG OS>OD -- s/p SLT OD (4/25/16), started on gtts in early 2000s, ?s/p SLT OS in 2015 (Dr. Gregory), pt denies LTG, has been on current drop regimen since 2014 per patient, H/O MRI in 2010 that was reportedly WNL -- K pachy:498/498    Tmax:     HVF:OD:inf paracentral dec sens fairly stable with fluct from 7963-4122 and ?prog vs fluct from 2004->2013 and OS:Inf paracentral dec sens with sup paracentral dec sens fairly stable with fluct from 3368-1583 and prog from 2004->2012      CDR:0.85/0.9     HRT/OCT: Mod-Severe RNFL thinning      FHX of Glc: No     Gonio:open       Intolerant to:  Lumigan -- too expensive    Asthma/COPD: No, tolerating topical BB  Steroid Use:No     Kidney Stones: No    Sulfa Allergy: No     IOP targets: -- IOP borderline  -- consider possible combined phaco/trab OS if confirmed evidence of progression on OCT and elevated IOPs   2)NS OU -- was following with Dr. Gregory -- will need IOL master and refraction prior combined phaco/trab OS -- pt drives a tractor trailer   3)PARKER lua DR  4)H/O Rectal CA -- following with Oncology -- in remission since 2003   5)DRE    MD:pt reports difficulty with visual field test    Patient will continue on Cosopt (Timolol/Dorzolamide) which is a blue top drop 2x/day (12 hours apart) in both eyes, Alphagan (Brimonidine) which is a purple top drop 2x/day (12 hours apart) in both eyes, Travatan/Latanoprost which is a teal top drop at bedtime in both eyes.  Patient will otherwise return to clinic in 4-6 months with refraction (prior to consideration of cataract surgery), visual field test (OS:LVC only), dilated eye exam, IOL master, OCT with RNFL analysis and IOP check.  Will re-discuss possible combined surgery in the left eye at that time.      Attending Physician Attestation:  Complete documentation of historical and exam elements from today's encounter can be found in the full encounter summary report (not reduplicated in this progress note). I personally obtained the  chief complaint(s) and history of present illness.  I confirmed and edited as necessary the review of systems, past medical/surgical history, family history, social history, and examination findings as documented by others; and I examined the patient myself. I personally reviewed the relevant tests, images, and reports as documented above. I formulated and edited as necessary the assessment and plan and discussed the findings and management plan with the patient and family.  - Annemarie Hubbard MD

## 2018-08-10 NOTE — PATIENT INSTRUCTIONS
Patient will continue on Cosopt (Timolol/Dorzolamide) which is a blue top drop 2x/day (12 hours apart) in both eyes, Alphagan (Brimonidine) which is a purple top drop 2x/day (12 hours apart) in both eyes, Travatan/Latanoprost which is a teal top drop at bedtime in both eyes.  Patient will otherwise return to clinic in 4-6 months with refraction (prior to consideration of cataract surgery), visual field test (OS:LVC only), dilated eye exam, IOL master, OCT with RNFL analysis and IOP check.  Will re-discuss possible combined surgery in the left eye at that time.